# Patient Record
Sex: FEMALE | Race: WHITE | NOT HISPANIC OR LATINO | Employment: FULL TIME | URBAN - METROPOLITAN AREA
[De-identification: names, ages, dates, MRNs, and addresses within clinical notes are randomized per-mention and may not be internally consistent; named-entity substitution may affect disease eponyms.]

---

## 2017-06-06 ENCOUNTER — OUTSIDE FACILITY SERVICE (OUTPATIENT)
Dept: GASTROENTEROLOGY | Facility: CLINIC | Age: 57
End: 2017-06-06

## 2017-06-06 PROCEDURE — 45385 COLONOSCOPY W/LESION REMOVAL: CPT | Performed by: INTERNAL MEDICINE

## 2017-06-06 PROCEDURE — G0500 MOD SEDAT ENDO SERVICE >5YRS: HCPCS | Performed by: INTERNAL MEDICINE

## 2017-06-08 ENCOUNTER — APPOINTMENT (OUTPATIENT)
Dept: CT IMAGING | Facility: HOSPITAL | Age: 57
End: 2017-06-08

## 2017-06-08 ENCOUNTER — HOSPITAL ENCOUNTER (EMERGENCY)
Facility: HOSPITAL | Age: 57
Discharge: HOME | End: 2017-06-08
Attending: EMERGENCY MEDICINE | Admitting: EMERGENCY MEDICINE

## 2017-06-08 ENCOUNTER — TELEPHONE (OUTPATIENT)
Dept: GASTROENTEROLOGY | Facility: CLINIC | Age: 57
End: 2017-06-08

## 2017-06-08 VITALS
TEMPERATURE: 98 F | HEART RATE: 66 BPM | WEIGHT: 223 LBS | DIASTOLIC BLOOD PRESSURE: 96 MMHG | OXYGEN SATURATION: 96 % | HEIGHT: 67 IN | SYSTOLIC BLOOD PRESSURE: 158 MMHG | BODY MASS INDEX: 35 KG/M2 | RESPIRATION RATE: 16 BRPM

## 2017-06-08 DIAGNOSIS — R10.32 ACUTE BILATERAL LOWER ABDOMINAL PAIN: Primary | ICD-10-CM

## 2017-06-08 DIAGNOSIS — R10.31 ACUTE BILATERAL LOWER ABDOMINAL PAIN: Primary | ICD-10-CM

## 2017-06-08 LAB
ALBUMIN SERPL-MCNC: 4.2 G/DL (ref 3.2–4.8)
ALBUMIN/GLOB SERPL: 1.4 G/DL (ref 1.5–2.5)
ALP SERPL-CCNC: 97 U/L (ref 25–100)
ALT SERPL W P-5'-P-CCNC: 30 U/L (ref 7–40)
ANION GAP SERPL CALCULATED.3IONS-SCNC: 3 MMOL/L (ref 3–11)
AST SERPL-CCNC: 23 U/L (ref 0–33)
BASOPHILS # BLD AUTO: 0.02 10*3/MM3 (ref 0–0.2)
BASOPHILS NFR BLD AUTO: 0.3 % (ref 0–1)
BILIRUB SERPL-MCNC: 0.5 MG/DL (ref 0.3–1.2)
BILIRUB UR QL STRIP: NEGATIVE
BUN BLD-MCNC: 12 MG/DL (ref 9–23)
BUN/CREAT SERPL: 17.1 (ref 7–25)
CALCIUM SPEC-SCNC: 9.5 MG/DL (ref 8.7–10.4)
CHLORIDE SERPL-SCNC: 103 MMOL/L (ref 99–109)
CLARITY UR: CLEAR
CO2 SERPL-SCNC: 33 MMOL/L (ref 20–31)
COLOR UR: YELLOW
CREAT BLD-MCNC: 0.7 MG/DL (ref 0.6–1.3)
D-LACTATE SERPL-SCNC: 0.9 MMOL/L (ref 0.5–2)
DEPRECATED RDW RBC AUTO: 45.3 FL (ref 37–54)
EOSINOPHIL # BLD AUTO: 0.06 10*3/MM3 (ref 0.1–0.3)
EOSINOPHIL NFR BLD AUTO: 0.9 % (ref 0–3)
ERYTHROCYTE [DISTWIDTH] IN BLOOD BY AUTOMATED COUNT: 12.5 % (ref 11.3–14.5)
GFR SERPL CREATININE-BSD FRML MDRD: 86 ML/MIN/1.73
GLOBULIN UR ELPH-MCNC: 3 GM/DL
GLUCOSE BLD-MCNC: 95 MG/DL (ref 70–100)
GLUCOSE UR STRIP-MCNC: NEGATIVE MG/DL
HCT VFR BLD AUTO: 41.5 % (ref 34.5–44)
HGB BLD-MCNC: 14.2 G/DL (ref 11.5–15.5)
HGB UR QL STRIP.AUTO: NEGATIVE
HOLD SPECIMEN: NORMAL
HOLD SPECIMEN: NORMAL
IMM GRANULOCYTES # BLD: 0.01 10*3/MM3 (ref 0–0.03)
IMM GRANULOCYTES NFR BLD: 0.2 % (ref 0–0.6)
KETONES UR QL STRIP: NEGATIVE
LEUKOCYTE ESTERASE UR QL STRIP.AUTO: NEGATIVE
LYMPHOCYTES # BLD AUTO: 2.34 10*3/MM3 (ref 0.6–4.8)
LYMPHOCYTES NFR BLD AUTO: 35.4 % (ref 24–44)
MCH RBC QN AUTO: 33.6 PG (ref 27–31)
MCHC RBC AUTO-ENTMCNC: 34.2 G/DL (ref 32–36)
MCV RBC AUTO: 98.3 FL (ref 80–99)
MONOCYTES # BLD AUTO: 0.52 10*3/MM3 (ref 0–1)
MONOCYTES NFR BLD AUTO: 7.9 % (ref 0–12)
NEUTROPHILS # BLD AUTO: 3.66 10*3/MM3 (ref 1.5–8.3)
NEUTROPHILS NFR BLD AUTO: 55.3 % (ref 41–71)
NITRITE UR QL STRIP: NEGATIVE
PH UR STRIP.AUTO: 6.5 [PH] (ref 5–8)
PLATELET # BLD AUTO: 200 10*3/MM3 (ref 150–450)
PMV BLD AUTO: 9.2 FL (ref 6–12)
POTASSIUM BLD-SCNC: 3.6 MMOL/L (ref 3.5–5.5)
PROT SERPL-MCNC: 7.2 G/DL (ref 5.7–8.2)
PROT UR QL STRIP: NEGATIVE
RBC # BLD AUTO: 4.22 10*6/MM3 (ref 3.89–5.14)
SODIUM BLD-SCNC: 139 MMOL/L (ref 132–146)
SP GR UR STRIP: 1.05 (ref 1–1.03)
UROBILINOGEN UR QL STRIP: ABNORMAL
WBC NRBC COR # BLD: 6.61 10*3/MM3 (ref 3.5–10.8)
WHOLE BLOOD HOLD SPECIMEN: NORMAL
WHOLE BLOOD HOLD SPECIMEN: NORMAL

## 2017-06-08 PROCEDURE — 80053 COMPREHEN METABOLIC PANEL: CPT | Performed by: EMERGENCY MEDICINE

## 2017-06-08 PROCEDURE — 81003 URINALYSIS AUTO W/O SCOPE: CPT | Performed by: EMERGENCY MEDICINE

## 2017-06-08 PROCEDURE — 0 IOPAMIDOL 61 % SOLUTION: Performed by: EMERGENCY MEDICINE

## 2017-06-08 PROCEDURE — 96361 HYDRATE IV INFUSION ADD-ON: CPT

## 2017-06-08 PROCEDURE — 25010000002 ONDANSETRON PER 1 MG: Performed by: EMERGENCY MEDICINE

## 2017-06-08 PROCEDURE — 99283 EMERGENCY DEPT VISIT LOW MDM: CPT

## 2017-06-08 PROCEDURE — 74177 CT ABD & PELVIS W/CONTRAST: CPT

## 2017-06-08 PROCEDURE — 83605 ASSAY OF LACTIC ACID: CPT | Performed by: EMERGENCY MEDICINE

## 2017-06-08 PROCEDURE — 25010000002 HYDROMORPHONE PER 4 MG: Performed by: EMERGENCY MEDICINE

## 2017-06-08 PROCEDURE — 96374 THER/PROPH/DIAG INJ IV PUSH: CPT

## 2017-06-08 PROCEDURE — 85025 COMPLETE CBC W/AUTO DIFF WBC: CPT | Performed by: EMERGENCY MEDICINE

## 2017-06-08 PROCEDURE — 96375 TX/PRO/DX INJ NEW DRUG ADDON: CPT

## 2017-06-08 RX ORDER — ATORVASTATIN CALCIUM 80 MG/1
80 TABLET, FILM COATED ORAL DAILY
COMMUNITY

## 2017-06-08 RX ORDER — OXYBUTYNIN CHLORIDE 10 MG/1
15 TABLET, EXTENDED RELEASE ORAL DAILY
COMMUNITY
End: 2018-08-09

## 2017-06-08 RX ORDER — ONDANSETRON 2 MG/ML
4 INJECTION INTRAMUSCULAR; INTRAVENOUS ONCE
Status: COMPLETED | OUTPATIENT
Start: 2017-06-08 | End: 2017-06-08

## 2017-06-08 RX ORDER — SODIUM CHLORIDE 9 MG/ML
125 INJECTION, SOLUTION INTRAVENOUS CONTINUOUS
Status: DISCONTINUED | OUTPATIENT
Start: 2017-06-08 | End: 2017-06-08 | Stop reason: HOSPADM

## 2017-06-08 RX ORDER — CLOBETASOL PROPIONATE 0.5 MG/G
1 CREAM TOPICAL 2 TIMES DAILY PRN
COMMUNITY
End: 2018-08-09

## 2017-06-08 RX ORDER — DICLOFENAC SODIUM AND MISOPROSTOL 75; 200 MG/1; UG/1
1 TABLET, DELAYED RELEASE ORAL 2 TIMES DAILY PRN
COMMUNITY
End: 2018-08-09

## 2017-06-08 RX ORDER — HYDROCHLOROTHIAZIDE 25 MG/1
25 TABLET ORAL DAILY PRN
COMMUNITY

## 2017-06-08 RX ORDER — HYDROMORPHONE HYDROCHLORIDE 1 MG/ML
0.5 INJECTION, SOLUTION INTRAMUSCULAR; INTRAVENOUS; SUBCUTANEOUS ONCE
Status: COMPLETED | OUTPATIENT
Start: 2017-06-08 | End: 2017-06-08

## 2017-06-08 RX ORDER — HYDROXYZINE PAMOATE 25 MG/1
25 CAPSULE ORAL EVERY 8 HOURS PRN
COMMUNITY
End: 2018-08-09

## 2017-06-08 RX ORDER — VENLAFAXINE HYDROCHLORIDE 150 MG/1
150 CAPSULE, EXTENDED RELEASE ORAL DAILY
COMMUNITY

## 2017-06-08 RX ORDER — PROMETHAZINE HYDROCHLORIDE AND CODEINE PHOSPHATE 6.25; 1 MG/5ML; MG/5ML
5 SYRUP ORAL 2 TIMES DAILY
COMMUNITY
End: 2017-09-09

## 2017-06-08 RX ORDER — AMITRIPTYLINE HYDROCHLORIDE 10 MG/1
10 TABLET, FILM COATED ORAL NIGHTLY
COMMUNITY

## 2017-06-08 RX ORDER — ESOMEPRAZOLE MAGNESIUM 40 MG/1
40 CAPSULE, DELAYED RELEASE ORAL
COMMUNITY

## 2017-06-08 RX ORDER — METOPROLOL TARTRATE 100 MG/1
100 TABLET ORAL DAILY
COMMUNITY

## 2017-06-08 RX ORDER — HYDROCODONE BITARTRATE AND ACETAMINOPHEN 5; 325 MG/1; MG/1
1 TABLET ORAL EVERY 6 HOURS PRN
Qty: 4 TABLET | Refills: 0 | Status: SHIPPED | OUTPATIENT
Start: 2017-06-08 | End: 2018-08-09

## 2017-06-08 RX ORDER — DIAZEPAM 5 MG/1
5 TABLET ORAL 3 TIMES DAILY PRN
COMMUNITY

## 2017-06-08 RX ORDER — TRIAMCINOLONE ACETONIDE 0.25 MG/G
1 CREAM TOPICAL 2 TIMES DAILY
COMMUNITY
End: 2018-08-09

## 2017-06-08 RX ORDER — ASPIRIN 81 MG/1
81 TABLET, CHEWABLE ORAL DAILY
COMMUNITY

## 2017-06-08 RX ORDER — HYDROXYCHLOROQUINE SULFATE 200 MG/1
200 TABLET, FILM COATED ORAL 2 TIMES DAILY
COMMUNITY
End: 2018-08-09

## 2017-06-08 RX ORDER — SACCHAROMYCES BOULARDII 250 MG
250 CAPSULE ORAL DAILY
COMMUNITY
End: 2018-08-09

## 2017-06-08 RX ADMIN — HYDROMORPHONE HYDROCHLORIDE 0.5 MG: 1 INJECTION, SOLUTION INTRAMUSCULAR; INTRAVENOUS; SUBCUTANEOUS at 15:31

## 2017-06-08 RX ADMIN — SODIUM CHLORIDE 125 ML/HR: 9 INJECTION, SOLUTION INTRAVENOUS at 16:20

## 2017-06-08 RX ADMIN — ONDANSETRON 4 MG: 2 INJECTION INTRAMUSCULAR; INTRAVENOUS at 15:28

## 2017-06-08 RX ADMIN — IOPAMIDOL 95 ML: 612 INJECTION, SOLUTION INTRAVENOUS at 16:05

## 2017-06-08 RX ADMIN — SODIUM CHLORIDE 1000 ML: 9 INJECTION, SOLUTION INTRAVENOUS at 15:25

## 2017-06-08 NOTE — ED PROVIDER NOTES
Subjective   HPI Comments: Chantell Castro is a pleasant 57 y.o.female who presents to the ED with c/o abdominal pain. Ms. Castro had a colonoscopy performed by Dr. Maradiaga 2 days ago. She had no problems following colonoscopy, and has had multiple colonoscopies in the past without complications.   The patient states at 0930 this morning she had sudden onset of lower abdominal pain, which radiated to her right side and right lower back. Pain is described as cramping. She notes she also began experiencing a large pressure in her rectal area, prompting presentation into the ED for evaluation. She states abdominal pain is constant and is currently still present at this time. Patient denies rinorrhea, sore throat, cough, congestion, fever, chills, or any other acute complaints at this time.     Patient is a 57 y.o. female presenting with abdominal pain.   History provided by:  Patient  Abdominal Pain   Pain location:  Periumbilical and suprapubic  Pain quality: cramping    Pain radiation: Left side, left lower back.  Pain severity:  Moderate  Onset quality:  Sudden  Duration: 0930 this morning.  Timing:  Constant  Progression:  Unchanged  Chronicity:  New  Context comment:  Pt had colonoscopy 2 days ago, sudden onset abdominal pain and rectal pressure this morning  Relieved by:  None tried  Worsened by:  Nothing  Ineffective treatments:  None tried  Associated symptoms: no chills, no constipation, no cough, no fatigue, no nausea and no sore throat    Risk factors: obesity        Review of Systems   Constitutional: Negative for chills and fatigue.   HENT: Negative for congestion, rhinorrhea and sore throat.    Respiratory: Negative for cough.    Gastrointestinal: Positive for abdominal pain and rectal pain (pressure). Negative for constipation and nausea.   Musculoskeletal: Back pain: lower back pain secondary to abdominal pain.   All other systems reviewed and are negative.      Past Medical History:   Diagnosis Date    • Arthritis    • Depression    • GERD (gastroesophageal reflux disease)    • Hyperlipidemia    • Hypertension    • Insomnia    • Irritable bowel syndrome    • Panic attack    • Perirectal inflammation    • Sciatica    2:52 PM  Patient had colonoscopy on 6/6/17 by Dr. Maradiaga, which showed one polyp. He recommended follow up in 5 years.     Allergies   Allergen Reactions   • Cephalosporins Itching   • Morphine And Related Itching       Past Surgical History:   Procedure Laterality Date   • BREAST EXCISIONAL BIOPSY Right 1995   • REDUCTION MAMMAPLASTY         Family History   Problem Relation Age of Onset   • Breast cancer Neg Hx    • Ovarian cancer Neg Hx        Social History     Social History   • Marital status:      Spouse name: N/A   • Number of children: N/A   • Years of education: N/A     Social History Main Topics   • Smoking status: Current Every Day Smoker     Packs/day: 0.50     Types: Cigarettes   • Smokeless tobacco: None   • Alcohol use Yes      Comment: SOCIAL   • Drug use: No   • Sexual activity: Defer     Other Topics Concern   • None     Social History Narrative   • None         Objective   Physical Exam   Constitutional: She is oriented to person, place, and time. She appears well-developed and well-nourished. No distress.   Alert, oriented, and in no acute distress.   HENT:   Head: Normocephalic and atraumatic.   Mouth/Throat: Oropharynx is clear and moist. No oropharyngeal exudate.   Eyes: Conjunctivae are normal. No scleral icterus.   Neck: Normal range of motion. Neck supple.   Cardiovascular: Normal rate, regular rhythm and normal heart sounds.  Exam reveals no gallop and no friction rub.    No murmur heard.  Pulmonary/Chest: Effort normal and breath sounds normal. No respiratory distress.   Abdominal: Soft. She exhibits no mass. There is tenderness. There is no guarding.   Moderately obese. Slightly decreased breath sounds. Soft, with tenderness in lower abdomen, on the left side more  so than right. No organomegaly masses or guarding.   Musculoskeletal: Normal range of motion. She exhibits no edema.   Neurological: She is alert and oriented to person, place, and time. She has normal reflexes. No cranial nerve deficit.   Face symmetric, tongue midline, voice strong. Vision, speech, and hearing preserved.    Skin: Skin is warm and dry. No erythema.   Psychiatric: She has a normal mood and affect. Her behavior is normal.   Nursing note and vitals reviewed.      Procedures         ED Course  ED Course   Comment By Time   Spoke with the patient about her allergies, she reports she can take dilaudid fine. -TEE Gloria Dempsey [facility] 06/08 0454       Recent Results (from the past 24 hour(s))   Comprehensive Metabolic Panel    Collection Time: 06/08/17  3:23 PM   Result Value Ref Range    Glucose 95 70 - 100 mg/dL    BUN 12 9 - 23 mg/dL    Creatinine 0.70 0.60 - 1.30 mg/dL    Sodium 139 132 - 146 mmol/L    Potassium 3.6 3.5 - 5.5 mmol/L    Chloride 103 99 - 109 mmol/L    CO2 33.0 (H) 20.0 - 31.0 mmol/L    Calcium 9.5 8.7 - 10.4 mg/dL    Total Protein 7.2 5.7 - 8.2 g/dL    Albumin 4.20 3.20 - 4.80 g/dL    ALT (SGPT) 30 7 - 40 U/L    AST (SGOT) 23 0 - 33 U/L    Alkaline Phosphatase 97 25 - 100 U/L    Total Bilirubin 0.5 0.3 - 1.2 mg/dL    eGFR Non African Amer 86 >60 mL/min/1.73    Globulin 3.0 gm/dL    A/G Ratio 1.4 (L) 1.5 - 2.5 g/dL    BUN/Creatinine Ratio 17.1 7.0 - 25.0    Anion Gap 3.0 3.0 - 11.0 mmol/L   Lactic Acid, Plasma    Collection Time: 06/08/17  3:23 PM   Result Value Ref Range    Lactate 0.9 0.5 - 2.0 mmol/L   CBC Auto Differential    Collection Time: 06/08/17  3:23 PM   Result Value Ref Range    WBC 6.61 3.50 - 10.80 10*3/mm3    RBC 4.22 3.89 - 5.14 10*6/mm3    Hemoglobin 14.2 11.5 - 15.5 g/dL    Hematocrit 41.5 34.5 - 44.0 %    MCV 98.3 80.0 - 99.0 fL    MCH 33.6 (H) 27.0 - 31.0 pg    MCHC 34.2 32.0 - 36.0 g/dL    RDW 12.5 11.3 - 14.5 %    RDW-SD 45.3 37.0 - 54.0 fl    MPV 9.2 6.0 - 12.0 fL     Platelets 200 150 - 450 10*3/mm3    Neutrophil % 55.3 41.0 - 71.0 %    Lymphocyte % 35.4 24.0 - 44.0 %    Monocyte % 7.9 0.0 - 12.0 %    Eosinophil % 0.9 0.0 - 3.0 %    Basophil % 0.3 0.0 - 1.0 %    Immature Grans % 0.2 0.0 - 0.6 %    Neutrophils, Absolute 3.66 1.50 - 8.30 10*3/mm3    Lymphocytes, Absolute 2.34 0.60 - 4.80 10*3/mm3    Monocytes, Absolute 0.52 0.00 - 1.00 10*3/mm3    Eosinophils, Absolute 0.06 (L) 0.10 - 0.30 10*3/mm3    Basophils, Absolute 0.02 0.00 - 0.20 10*3/mm3    Immature Grans, Absolute 0.01 0.00 - 0.03 10*3/mm3   Light Blue Top    Collection Time: 06/08/17  3:23 PM   Result Value Ref Range    Extra Tube hold for add-on    Green Top (Gel)    Collection Time: 06/08/17  3:23 PM   Result Value Ref Range    Extra Tube Hold for add-ons.    Lavender Top    Collection Time: 06/08/17  3:23 PM   Result Value Ref Range    Extra Tube hold for add-on    Gold Top - SST    Collection Time: 06/08/17  3:23 PM   Result Value Ref Range    Extra Tube Hold for add-ons.    Urinalysis With / Culture If Indicated    Collection Time: 06/08/17  4:55 PM   Result Value Ref Range    Color, UA Yellow Yellow, Straw    Appearance, UA Clear Clear    pH, UA 6.5 5.0 - 8.0    Specific Gravity, UA 1.052 (H) 1.001 - 1.030    Glucose, UA Negative Negative    Ketones, UA Negative Negative    Bilirubin, UA Negative Negative    Blood, UA Negative Negative    Protein, UA Negative Negative    Leuk Esterase, UA Negative Negative    Nitrite, UA Negative Negative    Urobilinogen, UA 0.2 E.U./dL 0.2 - 1.0 E.U./dL     Note: In addition to lab results from this visit, the labs listed above may include labs taken at another facility or during a different encounter within the last 24 hours. Please correlate lab times with ED admission and discharge times for further clarification of the services performed during this visit.    CT Abdomen Pelvis With Contrast   Preliminary Result   Normal examination.       D:  06/08/2017   E:  06/08/2017     "                Vitals:    06/08/17 1125 06/08/17 1424 06/08/17 1619 06/08/17 1719   BP: 147/78 (!) 140/106 138/89 (!) 146/102   BP Location: Left arm Left arm Left arm Left arm   Patient Position: Sitting Lying Lying Lying   Pulse: 65 62 71 66   Resp: 16      Temp: 97.9 °F (36.6 °C)      TempSrc: Oral      SpO2: 96% 94% 97% 92%   Weight: 223 lb (101 kg)      Height: 67\" (170.2 cm)        Medications   sodium chloride 0.9 % infusion (125 mL/hr Intravenous New Bag 6/8/17 1620)   sodium chloride 0.9 % bolus 1,000 mL (0 mL Intravenous Stopped 6/8/17 1620)   ondansetron (ZOFRAN) injection 4 mg (4 mg Intravenous Given 6/8/17 1528)   HYDROmorphone (DILAUDID) injection 0.5 mg (0.5 mg Intravenous Given 6/8/17 1531)   iopamidol (ISOVUE-300) 61 % injection 100 mL (95 mL Intravenous Given 6/8/17 1605)     ECG/EMG Results (last 24 hours)     ** No results found for the last 24 hours. **                    MDM  Number of Diagnoses or Management Options  Acute bilateral lower abdominal pain:   Diagnosis management comments:         Reviewed all available studies at the bedside with the patient and they are reassuring.  Her CT scan shows no evidence of perforation.  Her lactic acid and CBC are unremarkable.    On reexamination she just is very minimal abdominal tenderness.    I spoke Dr. Ramsay this, on-call for Dr. Maradiaga, is possible that when the patient had her polypectomy she may have had cautery and this could be the cause of this pain.  Thankfully everything looks good currently.  I'll write her for a few pain pills if needed I've a call Dr. Maradiaga's office tomorrow to come to check in for follow-up she'll return here to the ER if worse in any way discussed indications including increased pain, fever, passing blood in stool    All are agreeable with the plan       Amount and/or Complexity of Data Reviewed  Clinical lab tests: reviewed  Tests in the radiology section of CPT®: reviewed        Final diagnoses:   Acute " bilateral lower abdominal pain     EMR Dragon/Transcription disclaimer:   Much of this encounter note is an electronic transcription/translation of spoken language to printed text. The electronic translation of spoken language may permit erroneous, or at times, nonsensical words or phrases to be inadvertently transcribed; Although I have reviewed the note for such errors, some may still exist.     Documentation assistance provided by eduardo Lemos.  Information recorded by the scribe was done at my direction and has been verified and validated by me.     Gloria Lemos  06/08/17 1502       Jos Ramírez MD  06/08/17 7772

## 2017-09-08 ENCOUNTER — TRANSCRIBE ORDERS (OUTPATIENT)
Dept: ADMINISTRATIVE | Facility: HOSPITAL | Age: 57
End: 2017-09-08

## 2017-09-08 ENCOUNTER — HOSPITAL ENCOUNTER (OUTPATIENT)
Dept: CT IMAGING | Facility: HOSPITAL | Age: 57
Discharge: HOME | End: 2017-09-08
Admitting: INTERNAL MEDICINE

## 2017-09-08 DIAGNOSIS — R10.32 LLQ ABDOMINAL PAIN: Primary | ICD-10-CM

## 2017-09-08 DIAGNOSIS — R10.32 LLQ ABDOMINAL PAIN: ICD-10-CM

## 2017-09-08 LAB — CREAT BLDA-MCNC: 0.9 MG/DL (ref 0.6–1.3)

## 2017-09-08 PROCEDURE — 0 DIATRIZOATE MEGLUMINE & SODIUM PER 1 ML: Performed by: INTERNAL MEDICINE

## 2017-09-08 PROCEDURE — 74177 CT ABD & PELVIS W/CONTRAST: CPT

## 2017-09-08 PROCEDURE — 0 IOPAMIDOL 61 % SOLUTION: Performed by: INTERNAL MEDICINE

## 2017-09-08 PROCEDURE — 82565 ASSAY OF CREATININE: CPT

## 2017-09-08 RX ADMIN — IOPAMIDOL 100 ML: 612 INJECTION, SOLUTION INTRAVENOUS at 13:58

## 2017-09-08 RX ADMIN — DIATRIZOATE MEGLUMINE AND DIATRIZOATE SODIUM 15 ML: 660; 100 LIQUID ORAL; RECTAL at 12:55

## 2018-08-07 ENCOUNTER — APPOINTMENT (OUTPATIENT)
Dept: GENERAL RADIOLOGY | Facility: HOSPITAL | Age: 58
End: 2018-08-07

## 2018-08-07 ENCOUNTER — HOSPITAL ENCOUNTER (EMERGENCY)
Facility: HOSPITAL | Age: 58
Discharge: HOME | End: 2018-08-07
Attending: EMERGENCY MEDICINE | Admitting: EMERGENCY MEDICINE

## 2018-08-07 VITALS
DIASTOLIC BLOOD PRESSURE: 89 MMHG | RESPIRATION RATE: 18 BRPM | OXYGEN SATURATION: 95 % | BODY MASS INDEX: 34.86 KG/M2 | SYSTOLIC BLOOD PRESSURE: 138 MMHG | WEIGHT: 230 LBS | HEART RATE: 75 BPM | HEIGHT: 68 IN | TEMPERATURE: 98.2 F

## 2018-08-07 DIAGNOSIS — R07.9 CHEST PAIN, UNSPECIFIED TYPE: Primary | ICD-10-CM

## 2018-08-07 DIAGNOSIS — I10 UNCONTROLLED HYPERTENSION: ICD-10-CM

## 2018-08-07 LAB
ALBUMIN SERPL-MCNC: 4.12 G/DL (ref 3.2–4.8)
ALBUMIN/GLOB SERPL: 1.4 G/DL (ref 1.5–2.5)
ALP SERPL-CCNC: 88 U/L (ref 25–100)
ALT SERPL W P-5'-P-CCNC: 31 U/L (ref 7–40)
ANION GAP SERPL CALCULATED.3IONS-SCNC: 1 MMOL/L (ref 3–11)
AST SERPL-CCNC: 21 U/L (ref 0–33)
BASOPHILS # BLD AUTO: 0.03 10*3/MM3 (ref 0–0.2)
BASOPHILS NFR BLD AUTO: 0.4 % (ref 0–1)
BILIRUB SERPL-MCNC: 0.5 MG/DL (ref 0.3–1.2)
BNP SERPL-MCNC: 35 PG/ML (ref 0–100)
BUN BLD-MCNC: 15 MG/DL (ref 9–23)
BUN/CREAT SERPL: 20.3 (ref 7–25)
CALCIUM SPEC-SCNC: 8.7 MG/DL (ref 8.7–10.4)
CHLORIDE SERPL-SCNC: 112 MMOL/L (ref 99–109)
CO2 SERPL-SCNC: 26 MMOL/L (ref 20–31)
CREAT BLD-MCNC: 0.74 MG/DL (ref 0.6–1.3)
DEPRECATED RDW RBC AUTO: 42.4 FL (ref 37–54)
EOSINOPHIL # BLD AUTO: 0.05 10*3/MM3 (ref 0–0.3)
EOSINOPHIL NFR BLD AUTO: 0.7 % (ref 0–3)
ERYTHROCYTE [DISTWIDTH] IN BLOOD BY AUTOMATED COUNT: 12.2 % (ref 11.3–14.5)
GFR SERPL CREATININE-BSD FRML MDRD: 80 ML/MIN/1.73
GLOBULIN UR ELPH-MCNC: 3 GM/DL
GLUCOSE BLD-MCNC: 113 MG/DL (ref 70–100)
HCT VFR BLD AUTO: 43 % (ref 34.5–44)
HGB BLD-MCNC: 14.9 G/DL (ref 11.5–15.5)
HOLD SPECIMEN: NORMAL
HOLD SPECIMEN: NORMAL
IMM GRANULOCYTES # BLD: 0.01 10*3/MM3 (ref 0–0.03)
IMM GRANULOCYTES NFR BLD: 0.1 % (ref 0–0.6)
LIPASE SERPL-CCNC: 43 U/L (ref 6–51)
LYMPHOCYTES # BLD AUTO: 2.68 10*3/MM3 (ref 0.6–4.8)
LYMPHOCYTES NFR BLD AUTO: 36 % (ref 24–44)
MCH RBC QN AUTO: 33 PG (ref 27–31)
MCHC RBC AUTO-ENTMCNC: 34.7 G/DL (ref 32–36)
MCV RBC AUTO: 95.1 FL (ref 80–99)
MONOCYTES # BLD AUTO: 0.43 10*3/MM3 (ref 0–1)
MONOCYTES NFR BLD AUTO: 5.8 % (ref 0–12)
NEUTROPHILS # BLD AUTO: 4.25 10*3/MM3 (ref 1.5–8.3)
NEUTROPHILS NFR BLD AUTO: 57.1 % (ref 41–71)
PLATELET # BLD AUTO: 193 10*3/MM3 (ref 150–450)
PMV BLD AUTO: 9.4 FL (ref 6–12)
POTASSIUM BLD-SCNC: 3.6 MMOL/L (ref 3.5–5.5)
PROT SERPL-MCNC: 7.1 G/DL (ref 5.7–8.2)
RBC # BLD AUTO: 4.52 10*6/MM3 (ref 3.89–5.14)
SODIUM BLD-SCNC: 139 MMOL/L (ref 132–146)
TROPONIN I SERPL-MCNC: 0 NG/ML (ref 0–0.07)
TROPONIN I SERPL-MCNC: 0 NG/ML (ref 0–0.07)
WBC NRBC COR # BLD: 7.44 10*3/MM3 (ref 3.5–10.8)
WHOLE BLOOD HOLD SPECIMEN: NORMAL
WHOLE BLOOD HOLD SPECIMEN: NORMAL

## 2018-08-07 PROCEDURE — 25010000002 LORAZEPAM PER 2 MG: Performed by: EMERGENCY MEDICINE

## 2018-08-07 PROCEDURE — 71045 X-RAY EXAM CHEST 1 VIEW: CPT

## 2018-08-07 PROCEDURE — 83690 ASSAY OF LIPASE: CPT | Performed by: EMERGENCY MEDICINE

## 2018-08-07 PROCEDURE — 85025 COMPLETE CBC W/AUTO DIFF WBC: CPT | Performed by: EMERGENCY MEDICINE

## 2018-08-07 PROCEDURE — 96361 HYDRATE IV INFUSION ADD-ON: CPT

## 2018-08-07 PROCEDURE — 36415 COLL VENOUS BLD VENIPUNCTURE: CPT

## 2018-08-07 PROCEDURE — 99285 EMERGENCY DEPT VISIT HI MDM: CPT

## 2018-08-07 PROCEDURE — 83880 ASSAY OF NATRIURETIC PEPTIDE: CPT | Performed by: EMERGENCY MEDICINE

## 2018-08-07 PROCEDURE — 84484 ASSAY OF TROPONIN QUANT: CPT

## 2018-08-07 PROCEDURE — 93005 ELECTROCARDIOGRAM TRACING: CPT

## 2018-08-07 PROCEDURE — 93005 ELECTROCARDIOGRAM TRACING: CPT | Performed by: EMERGENCY MEDICINE

## 2018-08-07 PROCEDURE — 80053 COMPREHEN METABOLIC PANEL: CPT | Performed by: EMERGENCY MEDICINE

## 2018-08-07 PROCEDURE — 96374 THER/PROPH/DIAG INJ IV PUSH: CPT

## 2018-08-07 RX ORDER — CLONIDINE HYDROCHLORIDE 0.1 MG/1
0.1 TABLET ORAL ONCE
Status: COMPLETED | OUTPATIENT
Start: 2018-08-07 | End: 2018-08-07

## 2018-08-07 RX ORDER — DIPHENOXYLATE HYDROCHLORIDE AND ATROPINE SULFATE 2.5; .025 MG/1; MG/1
1 TABLET ORAL 4 TIMES DAILY PRN
COMMUNITY

## 2018-08-07 RX ORDER — MONTELUKAST SODIUM 4 MG/1
1 TABLET, CHEWABLE ORAL 2 TIMES DAILY
COMMUNITY

## 2018-08-07 RX ORDER — NITROGLYCERIN 0.4 MG/1
0.4 TABLET SUBLINGUAL
Status: DISCONTINUED | OUTPATIENT
Start: 2018-08-07 | End: 2018-08-07 | Stop reason: HOSPADM

## 2018-08-07 RX ORDER — PENTOXIFYLLINE 400 MG/1
400 TABLET, EXTENDED RELEASE ORAL 2 TIMES DAILY
COMMUNITY

## 2018-08-07 RX ORDER — LORAZEPAM 2 MG/ML
0.5 INJECTION INTRAMUSCULAR ONCE
Status: COMPLETED | OUTPATIENT
Start: 2018-08-07 | End: 2018-08-07

## 2018-08-07 RX ORDER — SODIUM CHLORIDE 0.9 % (FLUSH) 0.9 %
10 SYRINGE (ML) INJECTION AS NEEDED
Status: DISCONTINUED | OUTPATIENT
Start: 2018-08-07 | End: 2018-08-07 | Stop reason: HOSPADM

## 2018-08-07 RX ORDER — ONDANSETRON HYDROCHLORIDE 8 MG/1
TABLET, FILM COATED ORAL EVERY 8 HOURS PRN
COMMUNITY

## 2018-08-07 RX ADMIN — NITROGLYCERIN 1 INCH: 20 OINTMENT TOPICAL at 15:23

## 2018-08-07 RX ADMIN — LORAZEPAM 0.5 MG: 2 INJECTION INTRAMUSCULAR; INTRAVENOUS at 12:57

## 2018-08-07 RX ADMIN — CLONIDINE HYDROCHLORIDE 0.1 MG: 0.1 TABLET ORAL at 14:33

## 2018-08-07 RX ADMIN — SODIUM CHLORIDE 1000 ML: 9 INJECTION, SOLUTION INTRAVENOUS at 12:55

## 2018-08-07 NOTE — ED PROVIDER NOTES
Jesusita Castro is a 58 yr old white female that presents to the ER with c/o chest pain.  Pt explains that she has been having intermittent episodes of chest pain over the past 2 months.  Pt explains that she is currently seeing Dr. Yo with cardiology in [place]. Pt is currently wearing a holter monitor.  Explains that she's had a carotid ultrasound as well as echocardiogram.  She advises that her brother has a history of a bypass procedure as well as stent placement.  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  Patient went to take her break outside and she became dizzy.  Patient explains she became diaphoretic and felt like something was sitting on her chest.  Patient reports she felt as though she was going to pass out.  Patient complained of nausea but denies vomiting.  Patient denies any shortness of breath.  Patient is a pack per day smoker.  Patient was smoking when her symptoms occur.  History of hypertension but no history of a stress test or cardiac catheterization.  She explains that they are setting her up for a stress test.         History provided by:  Patient  Chest Pain   Pain location:  R chest and substernal area  Pain quality comment:  Heaviness  Pain severity:  Mild  Progression:  Improving  Associated symptoms: anxiety, dizziness, nausea and near-syncope    Associated symptoms: no abdominal pain, no fatigue, no fever, no shortness of breath and no vomiting    Risk factors: hypertension, obesity and smoking        Review of Systems   Constitutional: Negative for chills, fatigue and fever.   Respiratory: Negative for shortness of breath.    Cardiovascular: Positive for chest pain and near-syncope.   Gastrointestinal: Positive for nausea. Negative for abdominal pain and vomiting.   Neurological: Positive for dizziness.   All other systems reviewed and are negative.      Past Medical History:   Diagnosis Date   • Arthritis    • Depression    • GERD (gastroesophageal reflux  disease)    • Hyperlipidemia    • Hypertension    • Insomnia    • Irritable bowel syndrome    • Panic attack    • Perirectal inflammation    • Sciatica        Allergies   Allergen Reactions   • Cephalosporins Itching   • Morphine And Related Itching       Past Surgical History:   Procedure Laterality Date   • BREAST EXCISIONAL BIOPSY Right 1995   • REDUCTION MAMMAPLASTY         Family History   Problem Relation Age of Onset   • Breast cancer Neg Hx    • Ovarian cancer Neg Hx        Social History     Social History   • Marital status:      Social History Main Topics   • Smoking status: Current Every Day Smoker     Packs/day: 0.50     Types: Cigarettes   • Alcohol use Yes      Comment: SOCIAL   • Drug use: No   • Sexual activity: Defer     Other Topics Concern   • Not on file           Objective   Physical Exam   Constitutional: She is oriented to person, place, and time. She appears well-developed and well-nourished. No distress.   HENT:   Head: Normocephalic and atraumatic.   Right Ear: External ear normal.   Left Ear: External ear normal.   Mouth/Throat: Oropharynx is clear and moist.   Eyes: Pupils are equal, round, and reactive to light. EOM are normal.   Neck: Normal range of motion.   Cardiovascular: Normal rate, regular rhythm and normal heart sounds.    Pulmonary/Chest: Effort normal and breath sounds normal. No respiratory distress. She has no wheezes.   Abdominal: Soft. Bowel sounds are normal. She exhibits no distension. There is no tenderness.   Musculoskeletal: Normal range of motion.   Neurological: She is alert and oriented to person, place, and time. No cranial nerve deficit. Coordination normal.   Skin: Skin is warm and dry.   Psychiatric: Her mood appears anxious.   Nursing note and vitals reviewed.      Procedures           ED Course  ED Course as of Aug 07 2306   Tue Aug 07, 2018   2301 1500  contacted Dr. Yo office, they suggested admitting the patient for an inpatient stress test.     [KG]   3377 1515  Discussed the patient with Dr. Perea.  She suggests contacting cardiology a this time.    [KG]   5954 1520  [facility]ed [facility], pt will be dc home and set up for an outpt stress test.    [KG]   5051 1532  I discussed the plan with the patient at this time. Pt agrees with treatment at this time. Pt to rtn for any worsening symptoms.    [KG]      ED Course User Index  [KG] Pushpa Benitez, APRN      Recent Results (from the past 24 hour(s))   Comprehensive Metabolic Panel    Collection Time: 08/07/18 11:41 AM   Result Value Ref Range    Glucose 113 (H) 70 - 100 mg/dL    BUN 15 9 - 23 mg/dL    Creatinine 0.74 0.60 - 1.30 mg/dL    Sodium 139 132 - 146 mmol/L    Potassium 3.6 3.5 - 5.5 mmol/L    Chloride 112 (H) 99 - 109 mmol/L    CO2 26.0 20.0 - 31.0 mmol/L    Calcium 8.7 8.7 - 10.4 mg/dL    Total Protein 7.1 5.7 - 8.2 g/dL    Albumin 4.12 3.20 - 4.80 g/dL    ALT (SGPT) 31 7 - 40 U/L    AST (SGOT) 21 0 - 33 U/L    Alkaline Phosphatase 88 25 - 100 U/L    Total Bilirubin 0.5 0.3 - 1.2 mg/dL    eGFR Non African Amer 80 >60 mL/min/1.73    Globulin 3.0 gm/dL    A/G Ratio 1.4 (L) 1.5 - 2.5 g/dL    BUN/Creatinine Ratio 20.3 7.0 - 25.0    Anion Gap 1.0 (L) 3.0 - 11.0 mmol/L   Lipase    Collection Time: 08/07/18 11:41 AM   Result Value Ref Range    Lipase 43 6 - 51 U/L   BNP    Collection Time: 08/07/18 11:41 AM   Result Value Ref Range    BNP 35.0 0.0 - 100.0 pg/mL   Light Blue Top    Collection Time: 08/07/18 11:41 AM   Result Value Ref Range    Extra Tube hold for add-on    Green Top (Gel)    Collection Time: 08/07/18 11:41 AM   Result Value Ref Range    Extra Tube Hold for add-ons.    Lavender Top    Collection Time: 08/07/18 11:41 AM   Result Value Ref Range    Extra Tube hold for add-on    Gold Top - SST    Collection Time: 08/07/18 11:41 AM   Result Value Ref Range    Extra Tube Hold for add-ons.    CBC Auto Differential    Collection Time: 08/07/18 11:41 AM   Result Value Ref Range     WBC 7.44 3.50 - 10.80 10*3/mm3    RBC 4.52 3.89 - 5.14 10*6/mm3    Hemoglobin 14.9 11.5 - 15.5 g/dL    Hematocrit 43.0 34.5 - 44.0 %    MCV 95.1 80.0 - 99.0 fL    MCH 33.0 (H) 27.0 - 31.0 pg    MCHC 34.7 32.0 - 36.0 g/dL    RDW 12.2 11.3 - 14.5 %    RDW-SD 42.4 37.0 - 54.0 fl    MPV 9.4 6.0 - 12.0 fL    Platelets 193 150 - 450 10*3/mm3    Neutrophil % 57.1 41.0 - 71.0 %    Lymphocyte % 36.0 24.0 - 44.0 %    Monocyte % 5.8 0.0 - 12.0 %    Eosinophil % 0.7 0.0 - 3.0 %    Basophil % 0.4 0.0 - 1.0 %    Immature Grans % 0.1 0.0 - 0.6 %    Neutrophils, Absolute 4.25 1.50 - 8.30 10*3/mm3    Lymphocytes, Absolute 2.68 0.60 - 4.80 10*3/mm3    Monocytes, Absolute 0.43 0.00 - 1.00 10*3/mm3    Eosinophils, Absolute 0.05 0.00 - 0.30 10*3/mm3    Basophils, Absolute 0.03 0.00 - 0.20 10*3/mm3    Immature Grans, Absolute 0.01 0.00 - 0.03 10*3/mm3   POC Troponin, Rapid    Collection Time: 08/07/18 11:44 AM   Result Value Ref Range    Troponin I 0.00 0.00 - 0.07 ng/mL   POC Troponin, Rapid    Collection Time: 08/07/18  2:14 PM   Result Value Ref Range    Troponin I 0.00 0.00 - 0.07 ng/mL     Note: In addition to lab results from this visit, the labs listed above may include labs taken at another facility or during a different encounter within the last 24 hours. Please correlate lab times with ED admission and discharge times for further clarification of the services performed during this visit.    XR Chest 1 View   Final Result   Mild bibasilar atelectasis.       D:  08/07/2018   E:  08/07/2018       This report was finalized on 8/7/2018 8:57 PM by DR. Сергей Villalobos MD.            Vitals:    08/07/18 1423 08/07/18 1433 08/07/18 1453 08/07/18 1553   BP: 166/90 166/90 149/78 138/89   BP Location:       Patient Position:       Pulse: 71 71 74 75   Resp:       Temp:       TempSrc:       SpO2: 97%  97% 95%   Weight:       Height:         Medications   sodium chloride 0.9 % bolus 1,000 mL (0 mL Intravenous Stopped 8/7/18 1355)   LORazepam  (ATIVAN) injection 0.5 mg (0.5 mg Intravenous Given 8/7/18 1257)   CloNIDine (CATAPRES) tablet 0.1 mg (0.1 mg Oral Given 8/7/18 1433)   nitroglycerin (NITROSTAT) ointment 1 inch (1 inch Topical Given 8/7/18 1523)     ECG/EMG Results (last 24 hours)     Procedure Component Value Units Date/Time    ECG 12 Lead [[id number]] Collected:  08/07/18 1126     Updated:  08/07/18 1213    ECG 12 Lead [[id number]] Collected:  08/07/18 1403     Updated:  08/07/18 1403                  HEART Score (for prediction of 6-week risk of major adverse cardiac event) reviewed and/or performed as part of the patient evaluation and treatment planning process.  The result associated with this review/performance is: 3       MDM      Final diagnoses:   Chest pain, unspecified type   Uncontrolled hypertension            Pushpa Benitez, APRBURAK  08/07/18 1031

## 2018-08-08 NOTE — PROGRESS NOTES
[facility]      Encounter Date:08/09/2018     Chantell Castro  [address] [facility] [facility] [address]      1960    Barbara Wang    Chantell Castro is a 58 y.o. female.      Subjective:     Chief Complaint:  Chest pain     HPI   Ms. Castro is a 59 YO F with past medical history significant for HTN, HLP, depression/anxiety, GERD, IBS who presents to the [facility] at the request of Push[facility] BROTHERS/Dr. Ramírez for chest pain. Patient presented to the ED 8/7/18 with intermittent episodes of chest pain. She is currently being followed by Dr. Yo with cardiology in [place]. She reports having a recent echo, carotid ultrasound and is wearing a event monitor. On the day of the ED visit she became dizzy, diaphoretic and felt like something was sitting on her chest after she got up from her chair. She also felt nauseous and presyncopal. She has already been set up for stress test in [place] but she says it was denied. She has significant dyspnea on exertion. Chest pain occurs at rest and worsens with physical activity. She recently had to hire someone to mow her lawn because of her exertional chest pain. Chest pain was relieved in ED with nitroglycerin. Chest pain has been occurring for past year but has recently worsened over the past few months. She has been to the ED three times in the past year for chest pain.     She has significant family history. Brother was in his 30s with first MI and now is s/p CABG x 4    Cardiac risk factors:  dyslipidemia, hypertension, diabetes.  Tobacco use >40 years, sedentary lifestyle, obesity (BMI > 30), gender, age  Family history of premature coronary artery disease (male < 55 yrs, female <66 yrs)      Past Medical History:   Diagnosis Date   • Arthritis    • Depression    • GERD (gastroesophageal reflux disease)    • Hyperlipidemia    • Hypertension    • Insomnia    • Irritable bowel syndrome    • Panic attack    •  Perirectal inflammation    • Sciatica        Past Surgical History:   Procedure Laterality Date   • BREAST EXCISIONAL BIOPSY Right 1995   • FOOT SURGERY      right   • INCONTINENCE SURGERY     • REDUCTION MAMMAPLASTY         Family History   Problem Relation Age of Onset   • No Known Problems Mother    • Pulmonary embolism Father    • Arthritis Sister    • Heart disease Brother         STENTS< BYPASS   • Heart attack Paternal Uncle 61   • No Known Problems Maternal Grandmother    • No Known Problems Maternal Grandfather    • No Known Problems Paternal Grandmother    • Heart attack Paternal Grandfather 59   • Heart attack Paternal Aunt 32   • Breast cancer Neg Hx    • Ovarian cancer Neg Hx        Social History     Social History   • Marital status:      Spouse name: N/A   • Number of children: N/A   • Years of education: N/A     Occupational History   • Not on file.     Social History Main Topics   • Smoking status: Current Every Day Smoker     Packs/day: 0.50     Years: 35.00     Types: Cigarettes   • Smokeless tobacco: Never Used   • Alcohol use Yes      Comment: SOCIAL   • Drug use: No   • Sexual activity: Defer     Other Topics Concern   • Not on file     Social History Narrative    Caffeine Intake: 1-2  servings per day    Patient lives at home alone       Allergies   Allergen Reactions   • Cephalosporins Itching and GI Intolerance   • Morphine And Related Itching         Current Outpatient Prescriptions:   •  amitriptyline (ELAVIL) 10 MG tablet, Take 10 mg by mouth Every Night. Take 1 or 2 tablets at bedtime, Disp: , Rfl:   •  aspirin 81 MG chewable tablet, Chew 81 mg Daily., Disp: , Rfl:   •  atorvastatin (LIPITOR) 80 MG tablet, Take 80 mg by mouth Daily., Disp: , Rfl:   •  colestipol (COLESTID) 1 g tablet, Take 1 g by mouth 2 (Two) Times a Day., Disp: , Rfl:   •  diazePAM (VALIUM) 5 MG tablet, Take 5 mg by mouth 3 (Three) Times a Day As Needed for Anxiety., Disp: , Rfl:   •  diphenoxylate-atropine  (LOMOTIL) 2.5-0.025 MG per tablet, Take 1 tablet by mouth 4 (Four) Times a Day As Needed for Diarrhea., Disp: , Rfl:   •  esomeprazole (nexIUM) 40 MG capsule, Take 40 mg by mouth Every Morning Before Breakfast., Disp: , Rfl:   •  hydrochlorothiazide (HYDRODIURIL) 25 MG tablet, Take 25 mg by mouth Daily As Needed., Disp: , Rfl:   •  metoprolol tartrate (LOPRESSOR) 100 MG tablet, Take 100 mg by mouth Daily., Disp: , Rfl:   •  ondansetron (ZOFRAN) 8 MG tablet, Take  by mouth Every 8 (Eight) Hours As Needed for Nausea or Vomiting., Disp: , Rfl:   •  pentoxifylline (TRENtal) 400 MG CR tablet, Take 400 mg by mouth 2 (Two) Times a Day., Disp: , Rfl:   •  promethazine (PHENERGAN) 25 MG tablet, Take 25 mg by mouth Every 6 (Six) Hours As Needed., Disp: , Rfl:   •  venlafaxine XR (EFFEXOR-XR) 150 MG 24 hr capsule, Take 150 mg by mouth Daily., Disp: , Rfl:     The following portions of the patient's history were reviewed and updated as appropriate: allergies, current medications, past family history, past medical history, past social history, past surgical history and problem list.    Review of Systems   Constitution: Positive for weakness, malaise/fatigue and weight gain. Negative for chills and fever.   HENT: Positive for congestion and hearing loss.    Eyes: Negative.    Cardiovascular: Positive for chest pain, dyspnea on exertion, leg swelling, near-syncope and orthopnea. Negative for claudication, cyanosis, irregular heartbeat, palpitations, paroxysmal nocturnal dyspnea and syncope.   Respiratory: Positive for cough, shortness of breath, snoring, sputum production and wheezing.    Endocrine: Positive for polydipsia, polyphagia and polyuria.   Hematologic/Lymphatic: Does not bruise/bleed easily.   Skin: Negative for poor wound healing.   Musculoskeletal: Positive for joint pain.   Gastrointestinal: Positive for bloating, diarrhea, dysphagia, heartburn and nausea. Negative for abdominal pain, hematemesis, melena and  "vomiting.   Genitourinary: Positive for frequency and nocturia. Negative for hematuria.   Neurological: Positive for excessive daytime sleepiness, dizziness, light-headedness and loss of balance.   Psychiatric/Behavioral: Positive for altered mental status, depression and memory loss. The patient has insomnia and is nervous/anxious.    Allergic/Immunologic: Positive for environmental allergies.       Objective:     Vitals:    08/09/18 0821 08/09/18 0823 08/09/18 0824   BP: 171/96 (!) 158/101 (!) 181/91   BP Location: Right arm Left arm Left arm   Patient Position: Sitting Sitting Standing   Pulse: 61 61 67   Resp: 17     Temp: 97 °F (36.1 °C)     TempSrc: Temporal Artery      SpO2: 96%     Weight: 103 kg (227 lb)     Height: 170.2 cm (67\")         Physical Exam   Constitutional: She is oriented to person, place, and time. She appears well-developed and well-nourished. No distress.   HENT:   Head: Normocephalic.   Eyes: Pupils are equal, round, and reactive to light. Conjunctivae are normal.   Neck: Neck supple. No JVD present. No thyromegaly present.   Cardiovascular: Normal rate, regular rhythm, normal heart sounds and intact distal pulses.  Exam reveals no gallop and no friction rub.    No murmur heard.  Pulmonary/Chest: Effort normal and breath sounds normal. No respiratory distress. She has no wheezes. She has no rales. She exhibits no tenderness.   Abdominal: Soft. Bowel sounds are normal.   Musculoskeletal: Normal range of motion. She exhibits no edema.   Neurological: She is alert and oriented to person, place, and time.   Skin: Skin is warm and dry.   Psychiatric: She has a normal mood and affect. Her behavior is normal. Thought content normal.   Vitals reviewed.      Lab and Diagnostic Review:  Results for orders placed or performed during the hospital encounter of 08/07/18   Comprehensive Metabolic Panel   Result Value Ref Range    Glucose 113 (H) 70 - 100 mg/dL    BUN 15 9 - 23 mg/dL    Creatinine 0.74 " 0.60 - 1.30 mg/dL    Sodium 139 132 - 146 mmol/L    Potassium 3.6 3.5 - 5.5 mmol/L    Chloride 112 (H) 99 - 109 mmol/L    CO2 26.0 20.0 - 31.0 mmol/L    Calcium 8.7 8.7 - 10.4 mg/dL    Total Protein 7.1 5.7 - 8.2 g/dL    Albumin 4.12 3.20 - 4.80 g/dL    ALT (SGPT) 31 7 - 40 U/L    AST (SGOT) 21 0 - 33 U/L    Alkaline Phosphatase 88 25 - 100 U/L    Total Bilirubin 0.5 0.3 - 1.2 mg/dL    eGFR Non African Amer 80 >60 mL/min/1.73    Globulin 3.0 gm/dL    A/G Ratio 1.4 (L) 1.5 - 2.5 g/dL    BUN/Creatinine Ratio 20.3 7.0 - 25.0    Anion Gap 1.0 (L) 3.0 - 11.0 mmol/L   Lipase   Result Value Ref Range    Lipase 43 6 - 51 U/L   BNP   Result Value Ref Range    BNP 35.0 0.0 - 100.0 pg/mL   CBC Auto Differential   Result Value Ref Range    WBC 7.44 3.50 - 10.80 10*3/mm3    RBC 4.52 3.89 - 5.14 10*6/mm3    Hemoglobin 14.9 11.5 - 15.5 g/dL    Hematocrit 43.0 34.5 - 44.0 %    MCV 95.1 80.0 - 99.0 fL    MCH 33.0 (H) 27.0 - 31.0 pg    MCHC 34.7 32.0 - 36.0 g/dL    RDW 12.2 11.3 - 14.5 %    RDW-SD 42.4 37.0 - 54.0 fl    MPV 9.4 6.0 - 12.0 fL    Platelets 193 150 - 450 10*3/mm3    Neutrophil % 57.1 41.0 - 71.0 %    Lymphocyte % 36.0 24.0 - 44.0 %    Monocyte % 5.8 0.0 - 12.0 %    Eosinophil % 0.7 0.0 - 3.0 %    Basophil % 0.4 0.0 - 1.0 %    Immature Grans % 0.1 0.0 - 0.6 %    Neutrophils, Absolute 4.25 1.50 - 8.30 10*3/mm3    Lymphocytes, Absolute 2.68 0.60 - 4.80 10*3/mm3    Monocytes, Absolute 0.43 0.00 - 1.00 10*3/mm3    Eosinophils, Absolute 0.05 0.00 - 0.30 10*3/mm3    Basophils, Absolute 0.03 0.00 - 0.20 10*3/mm3    Immature Grans, Absolute 0.01 0.00 - 0.03 10*3/mm3   POC Troponin, Rapid   Result Value Ref Range    Troponin I 0.00 0.00 - 0.07 ng/mL   POC Troponin, Rapid   Result Value Ref Range    Troponin I 0.00 0.00 - 0.07 ng/mL     EKG in ED showed NSR, no acute ST/T wave changes    Assessment and Plan:   1. Chest pain, unspecified type  - DESTINEY risk score 2. Patient is high risk for ASCVD and symptoms are typical for stable  angina.  - Stress Test With [facility] today    2. HINTON (dyspnea on exertion)  - Unable to walk on treadmill due to exertional dyspnea and chest pain. Had recent echo. Will get results  - Stress Test With [facility]; Future    3. Essential hypertension  - Uncontrolled today. One dose of clonidine 0.1 mg given in clinic today ([id number]). BP to be rechecked once she is in CV Lab  - Recommend that she start checking BP at home. She is currently only on metoprolol tartrate once daily (confirmed with pharmacy). Advised to increase to twice a day and monitor HR  - Stress Test With [facility]; Future    4. Hyperlipidemia, unspecified hyperlipidemia type  - on max statin therapy  - Stress Test With [facility]; Future    5. Postural dizziness with presyncope  - Wearing event monitor  - Recent carotid ultrasound  - Stress Test With [facility]; Future    6. Tobacco abuse  - Cessation encouraged. She understands risks and is cutting down.   - Stress Test With [facility]; Future    7. Family history of premature CAD  - Stress Test With [facility]; Future        Will call with stress test results  Patient has appt in 2 weeks with cardiologist in [place]    It has been a pleasure to participate in the care of this patient.  Patient was instructed to call the [facility] with any questions, concerns, or worsening symptoms.    *Please note that portions of this note were completed with a voice recognition program. Efforts were made to edit the dictations, but occasionally words are mistranscribed.

## 2018-08-09 ENCOUNTER — OFFICE VISIT (OUTPATIENT)
Dept: CARDIOLOGY | Facility: HOSPITAL | Age: 58
End: 2018-08-09

## 2018-08-09 ENCOUNTER — HOSPITAL ENCOUNTER (OUTPATIENT)
Dept: CARDIOLOGY | Facility: HOSPITAL | Age: 58
Discharge: HOME | End: 2018-08-09
Attending: NURSE PRACTITIONER | Admitting: NURSE PRACTITIONER

## 2018-08-09 VITALS
OXYGEN SATURATION: 96 % | RESPIRATION RATE: 17 BRPM | DIASTOLIC BLOOD PRESSURE: 91 MMHG | SYSTOLIC BLOOD PRESSURE: 181 MMHG | HEART RATE: 67 BPM | BODY MASS INDEX: 35.63 KG/M2 | TEMPERATURE: 97 F | HEIGHT: 67 IN | WEIGHT: 227 LBS

## 2018-08-09 DIAGNOSIS — Z82.49 FAMILY HISTORY OF PREMATURE CAD: ICD-10-CM

## 2018-08-09 DIAGNOSIS — E78.5 HYPERLIPIDEMIA, UNSPECIFIED HYPERLIPIDEMIA TYPE: ICD-10-CM

## 2018-08-09 DIAGNOSIS — I10 ESSENTIAL HYPERTENSION: ICD-10-CM

## 2018-08-09 DIAGNOSIS — Z72.0 TOBACCO ABUSE: ICD-10-CM

## 2018-08-09 DIAGNOSIS — R06.09 DOE (DYSPNEA ON EXERTION): ICD-10-CM

## 2018-08-09 DIAGNOSIS — R42 POSTURAL DIZZINESS WITH PRESYNCOPE: ICD-10-CM

## 2018-08-09 DIAGNOSIS — R55 POSTURAL DIZZINESS WITH PRESYNCOPE: ICD-10-CM

## 2018-08-09 DIAGNOSIS — R07.9 CHEST PAIN, UNSPECIFIED TYPE: ICD-10-CM

## 2018-08-09 DIAGNOSIS — R07.9 CHEST PAIN, UNSPECIFIED TYPE: Primary | ICD-10-CM

## 2018-08-09 LAB
BH CV STRESS BP STAGE 1: NORMAL
BH CV STRESS BP STAGE 3: NORMAL
BH CV STRESS COMMENTS STAGE 1: NORMAL
BH CV STRESS DOSE REGADENOSON STAGE 1: 0.4
BH CV STRESS DURATION MIN STAGE 1: 0
BH CV STRESS DURATION MIN STAGE 2: 1
BH CV STRESS DURATION MIN STAGE 3: 1
BH CV STRESS DURATION MIN STAGE 4: 1
BH CV STRESS DURATION SEC STAGE 1: 10
BH CV STRESS DURATION SEC STAGE 2: 0
BH CV STRESS HR STAGE 1: 66
BH CV STRESS HR STAGE 2: 77
BH CV STRESS HR STAGE 3: 77
BH CV STRESS HR STAGE 4: 73
BH CV STRESS PROTOCOL 1: NORMAL
BH CV STRESS RECOVERY BP: NORMAL MMHG
BH CV STRESS RECOVERY HR: 65 BPM
BH CV STRESS STAGE 1: 1
BH CV STRESS STAGE 2: 2
BH CV STRESS STAGE 3: 3
BH CV STRESS STAGE 4: 4
LV EF NUC BP: 74 %
MAXIMAL PREDICTED HEART RATE: 160 BPM
PERCENT MAX PREDICTED HR: 48.75 %
STRESS BASELINE BP: NORMAL MMHG
STRESS BASELINE HR: 61 BPM
STRESS PERCENT HR: 57 %
STRESS POST PEAK BP: NORMAL MMHG
STRESS POST PEAK HR: 78 BPM
STRESS TARGET HR: 136 BPM

## 2018-08-09 PROCEDURE — 93018 CV STRESS TEST I&R ONLY: CPT | Performed by: INTERNAL MEDICINE

## 2018-08-09 PROCEDURE — A9555 RB82 RUBIDIUM: HCPCS | Performed by: NURSE PRACTITIONER

## 2018-08-09 PROCEDURE — 99204 OFFICE O/P NEW MOD 45 MIN: CPT | Performed by: NURSE PRACTITIONER

## 2018-08-09 PROCEDURE — 25010000002 REGADENOSON 0.4 MG/5ML SOLUTION: Performed by: NURSE PRACTITIONER

## 2018-08-09 PROCEDURE — 93017 CV STRESS TEST TRACING ONLY: CPT

## 2018-08-09 PROCEDURE — 0 RUBIDIUM CHLORIDE: Performed by: NURSE PRACTITIONER

## 2018-08-09 PROCEDURE — 78492 MYOCRD IMG PET MLT RST&STRS: CPT | Performed by: INTERNAL MEDICINE

## 2018-08-09 PROCEDURE — 78492 MYOCRD IMG PET MLT RST&STRS: CPT

## 2018-08-09 RX ORDER — PROMETHAZINE HYDROCHLORIDE 25 MG/1
25 TABLET ORAL EVERY 6 HOURS PRN
COMMUNITY

## 2018-08-09 RX ADMIN — REGADENOSON 0.4 MG: 0.08 INJECTION, SOLUTION INTRAVENOUS at 10:48

## 2018-08-09 RX ADMIN — RUBIDIUM CHLORIDE RB-82 1 DOSE: 150 INJECTION, SOLUTION INTRAVENOUS at 10:35

## 2018-08-09 RX ADMIN — RUBIDIUM CHLORIDE RB-82 1 DOSE: 150 INJECTION, SOLUTION INTRAVENOUS at 10:48
